# Patient Record
Sex: MALE | Race: WHITE
[De-identification: names, ages, dates, MRNs, and addresses within clinical notes are randomized per-mention and may not be internally consistent; named-entity substitution may affect disease eponyms.]

---

## 2017-02-15 ENCOUNTER — HOSPITAL ENCOUNTER (EMERGENCY)
Dept: HOSPITAL 80 - FED | Age: 77
Discharge: HOME | End: 2017-02-15
Payer: COMMERCIAL

## 2017-02-15 VITALS — DIASTOLIC BLOOD PRESSURE: 74 MMHG | SYSTOLIC BLOOD PRESSURE: 126 MMHG | OXYGEN SATURATION: 94 %

## 2017-02-15 VITALS — HEART RATE: 88 BPM

## 2017-02-15 VITALS — TEMPERATURE: 98.2 F | RESPIRATION RATE: 18 BRPM

## 2017-02-15 DIAGNOSIS — R06.02: Primary | ICD-10-CM

## 2017-02-15 DIAGNOSIS — Z79.82: ICD-10-CM

## 2017-02-15 LAB
% IMMATURE GRANULYOCYTES: 0.2 % (ref 0–1.1)
ABSOLUTE IMMATURE GRANULOCYTES: 0.02 10^3/UL (ref 0–0.1)
ABSOLUTE NRBC COUNT: 0 10^3/UL (ref 0–0.01)
ADD DIFF?: NO
ADD MORPH?: NO
ADD SCAN?: NO
ALBUMIN SERPL-MCNC: 4.4 G/DL (ref 3.5–5)
ALP SERPL-CCNC: 72 IU/L (ref 38–126)
ALT SERPL-CCNC: 34 IU/L (ref 21–72)
ANION GAP SERPL CALC-SCNC: 11 MEQ/L (ref 8–16)
APTT BLD: 27.8 SEC (ref 23–38)
AST SERPL-CCNC: 26 IU/L (ref 17–59)
ATYPICAL LYMPHOCYTE FLAG: 10 (ref 0–99)
BILIRUB SERPL-MCNC: 0.6 MG/DL (ref 0.1–1.4)
BILIRUBIN-CONJUGATED: 0.4 MG/DL (ref 0–0.5)
BILIRUBIN-UNCONJUGATED: 0.2 MG/DL (ref 0–1.1)
CALCIUM SERPL-MCNC: 10.3 MG/DL (ref 8.5–10.4)
CHLORIDE SERPL-SCNC: 102 MEQ/L (ref 97–110)
CO2 SERPL-SCNC: 26 MEQ/L (ref 22–31)
CREAT SERPL-MCNC: 0.9 MG/DL (ref 0.7–1.3)
ERYTHROCYTE [DISTWIDTH] IN BLOOD BY AUTOMATED COUNT: 13.2 % (ref 11.5–15.2)
FRAGMENT RBC FLAG: 0 (ref 0–99)
GFR SERPL CREATININE-BSD FRML MDRD: > 60 ML/MIN/{1.73_M2}
GLUCOSE SERPL-MCNC: 101 MG/DL (ref 70–100)
HCT VFR BLD CALC: 43 % (ref 40–51)
HGB BLD-MCNC: 15.1 G/DL (ref 13.7–17.5)
INR PPP: 1.04 (ref 0.83–1.16)
LEFT SHIFT FLG: 0 (ref 0–99)
LIPEMIA HEMOLYSIS FLAG: 90 (ref 0–99)
MCH RBC BLDCO QN: 31.1 PG (ref 27.9–34.1)
MCHC RBC AUTO-ENTMCNC: 35.1 G/DL (ref 32.4–36.7)
MCV RBC AUTO: 88.7 FL (ref 81.5–99.8)
NRBC-AUTO%: 0 % (ref 0–0.2)
PLATELET # BLD: 306 10^3/UL (ref 150–400)
PLATELET CLUMPS FLAG: 0 (ref 0–99)
PMV BLD AUTO: 9.3 FL (ref 8.7–11.7)
POTASSIUM SERPL-SCNC: 4.2 MEQ/L (ref 3.5–5.2)
PROT SERPL-MCNC: 7.5 G/DL (ref 6.3–8.2)
PROTHROMBIN TIME: 13.5 SEC (ref 12–15)
RBC # BLD AUTO: 4.85 10^6/UL (ref 4.4–6.38)
SODIUM SERPL-SCNC: 139 MEQ/L (ref 134–144)
TROPONIN I SERPL-MCNC: < 0.012 NG/ML (ref 0–0.03)

## 2017-02-15 NOTE — CPEKG
Heart Rate: 92

RR Interval: 652

P-R Interval: 152

QRSD Interval: 142

QT Interval: 396

QTC Interval: 490

P Axis: 61

QRS Axis: 56

T Wave Axis: 30

EKG Severity - ABNORMAL ECG -

EKG Impression: SINUS RHYTHM

EKG Impression: PROBABLE LEFT ATRIAL ABNORMALITY

EKG Impression: RIGHT BUNDLE BRANCH BLOCK

EKG Impression: BORDERLINE INFERIOR Q WAVES

Electronically Signed By: Mendy Lomeli 15-Feb-2017 20:42:30

## 2017-02-15 NOTE — EDPHY
H & P


Stated Complaint: sob on and off for 2 days


HPI/ROS: 





CHIEF COMPLAINT: Shortness of breath





HISTORY OF PRESENT ILLNESS:  intermittent dyspnea over the past week and a 

half.  This started he returned from a trip to [place].  The symptoms are 

described as difficulty taking a deep breath that then resolved.  Associated 

with shortness of breath at rest.  Worse with exertion.  No chest pain of any 

kind over the past 2 days but he did have a short course of very minimal pain 

previous to that.  No fever or chills.  No cough or congestion.  No lower 

extremity erythema, edema or pain.  However he is concerned because he has the 

same symptoms he has 6 years ago when he was diagnosed with pulmonary embolism.

  Does have a recent flight to [place] that he return from last Tuesday.  No 

other associated complaints or modifying factors.  Currently taking aspirin and 

no other anticoagulants





REVIEW OF SYSTEMS:


Ten systems reviewed and are negative unless otherwise noted in the HPI





EXAMINATION


General Appearance:  Alert, no distress


Head: normocephalic, atraumatic


Eyes:  Pupils equal and round, no conjunctival pallor or injection


ENT, Mouth:  Mucous membranes moist.  Uvula midline.  No erythema or edema.


Neck:  Normal inspection, supple, non-tender


Respiratory:  Lungs are clear to auscultation .  No wheezing, rhonchi or 

crackles.  No diminishment of distress.


Cardiovascular:    Tachycardic rate of 106 beats per minute.  Normal rhythm. 

Systolic murmur.  pulses intact distally.


Gastrointestinal:  Abdomen is soft and nontender


Back: non-tender, no bony abnormalities


Neurological:  A&O, nonfocal, normal gait


Skin:  Warm and dry, no rash


Extremities:  Nontender, no pedal edema .  No evidence of DVT


Psychiatric:  Mood and affect normal





DIFFERENTIAL DIAGNOSES:


Including but not limited to  PE, shortness of breath, pneumonia, ACS, 

bronchitis, pleural effusion





MDM:


2:30 p.m.


 dyspnea consistent with previous pulmonary embolism 6 years ago.  This has 

been present on and off for the past week and a half but worsened last night.  

He has no pain at this time and has had minimal pain at all over the past week 

and a half.  Vital signs are within normal limits with mild tachycardia.  I 

have ordered CT scan of the chest rule out PE.  EKG and labs are pending at 

this time.





4:14 p.m.


 patient is currently in CT scan at this time.  Laboratory studies reveal a 

normal creatinine on the point of care i-STAT.  His CBC is unremarkable.  

Remainder of labs are pending at this time.  EKG is unremarkable for any 

changes other than a new right bundle branch block.  No acute ischemia.





4:40 p.m.


 contacted by radiologist Dr. Grimm.  CT scan of the chest  has no evidence of 

PE.  There is some calcification of the ascending aorta and the coronary 

arteries but no acute findings.  I discussed this with the patient at this 

time.  He was pleased to hear this.  Vital signs remained stable. His 

laboratory studies are well within normal limits.  This includes a negative 

troponin and negative BNP.  He is comfortable with being discharged home at 

this time.  I do feel he is stable and safe to do so as he has had no chest 

pain of any kind.  He will follow up with his primary care physician to discuss 

with Pulmonary follow-up should they deem that necessary.  He is instructed to 

return to the emergency department should he have any onset of chest pain.








EKG:  Interpreted by Dr. Lomeli








SUPERVISION: Patient was evaluated in conjunction with the supervising 

physician.  Please see their note for details.


Source: Patient





- Personal History


Current Tetanus Diphtheria and Acellular Pertussis (TDAP): Yes





- Medical/Surgical History


Other PMH: PE, BACK CYST REMOVED, ARTHRITIS, HERNIA





- Social History


Smoking Status: Never smoked


Constitutional: 


 Initial Vital Signs











Temperature (C)  98.2 F   02/15/17 15:07


 


Heart Rate  103 H  02/15/17 15:07


 


Respiratory Rate  18   02/15/17 15:07


 


Blood Pressure  163/86 H  02/15/17 15:07


 


O2 Sat (%)  93   02/15/17 15:07








 











O2 Delivery Mode               Room Air














Allergies/Adverse Reactions: 


 





No Known Allergies Allergy (Unverified 06/08/10 13:12)


 








Home Medications: 














 Medication  Instructions  Recorded


 


Lotrel  06/08/10


 


ASPIRIN  02/15/17


 


Lipitor  02/15/17


 


Meloxicam  02/15/17














Medical Decision Making





- Data Points


Laboratory Results: 


 Laboratory Results





 02/15/17 15:45 





 02/15/17 15:45 





 











  02/15/17 02/15/17 02/15/17





  15:46 15:45 15:45


 


WBC      





    


 


RBC      





    


 


Hgb      





    


 


POC Hgb  15.3 gm/dL gm/dL    





   (14.5-17.3)   


 


Hct      





    


 


POC Hct  45 % %    





   (42.8-50.6)   


 


MCV      





    


 


MCH      





    


 


MCHC      





    


 


RDW      





    


 


Plt Count      





    


 


MPV      





    


 


Neut % (Auto)      





    


 


Lymph % (Auto)      





    


 


Mono % (Auto)      





    


 


Eos % (Auto)      





    


 


Baso % (Auto)      





    


 


Nucleat RBC Rel Count      





    


 


Absolute Neuts (auto)      





    


 


Absolute Lymphs (auto)      





    


 


Absolute Monos (auto)      





    


 


Absolute Eos (auto)      





    


 


Absolute Basos (auto)      





    


 


Absolute Nucleated RBC      





    


 


Immature Gran %      





    


 


Immature Gran #      





    


 


PT      13.5 SEC SEC





     (12.0-15.0) 


 


INR      1.04 





     (0.83-1.16) 


 


APTT      27.8 SEC SEC





     (23.0-38.0) 


 


POC Sodium  139 mEq/L mEq/L    





   (134-144)   


 


Sodium    139 mEq/L mEq/L  





    (134-144)  


 


POC Potassium  4.0 mEq/L mEq/L    





   (3.3-5.0)   


 


Potassium    4.2 mEq/L mEq/L  





    (3.5-5.2)  


 


POC Chloride  103 mEq/L mEq/L    





   ()   


 


Chloride    102 mEq/L mEq/L  





    ()  


 


Carbon Dioxide    26 mEq/l mEq/l  





    (22-31)  


 


Anion Gap    11 mEq/L mEq/L  





    (8-16)  


 


POC BUN  22 mg/dL mg/dL    





   (7-23)   


 


BUN    21 mg/dL mg/dL  





    (7-23)  


 


Creatinine    0.9 mg/dL mg/dL  





    (0.7-1.3)  


 


POC Creatinine  0.9 mg/dL mg/dL    





   (0.8-1.5)   


 


Estimated GFR    > 60   





    


 


Glucose    101 mg/dL H mg/dL  





    ()  


 


POC Glucose  104 mg/dL H mg/dL    





   ()   


 


Calcium    10.3 mg/dL mg/dL  





    (8.5-10.4)  


 


Total Bilirubin    0.6 mg/dL mg/dL  





    (0.1-1.4)  


 


Conjugated Bilirubin    0.4 mg/dL mg/dL  





    (0.0-0.5)  


 


Unconjugated Bilirubin    0.2 mg/dL mg/dL  





    (0.0-1.1)  


 


AST    26 IU/L IU/L  





    (17-59)  


 


ALT    34 IU/L IU/L  





    (21-72)  


 


Alkaline Phosphatase    72 IU/L IU/L  





    ()  


 


Troponin I    < 0.012 ng/mL ng/mL  





    (0-0.034)  


 


NT-Pro-B Natriuret Pep    95 pg/mL pg/mL  





    (0-125)  


 


Total Protein    7.5 g/dL g/dL  





    (6.3-8.2)  


 


Albumin    4.4 g/dL g/dL  





    (3.5-5.0)  














  02/15/17





  15:45


 


WBC  9.23 10^3/uL 10^3/uL





   (3.80-9.50) 


 


RBC  4.85 10^6/uL 10^6/uL





   (4.40-6.38) 


 


Hgb  15.1 g/dL g/dL





   (13.7-17.5) 


 


POC Hgb  





  


 


Hct  43.0 % %





   (40.0-51.0) 


 


POC Hct  





  


 


MCV  88.7 fL fL





   (81.5-99.8) 


 


MCH  31.1 pg pg





   (27.9-34.1) 


 


MCHC  35.1 g/dL g/dL





   (32.4-36.7) 


 


RDW  13.2 % %





   (11.5-15.2) 


 


Plt Count  306 10^3/uL 10^3/uL





   (150-400) 


 


MPV  9.3 fL fL





   (8.7-11.7) 


 


Neut % (Auto)  64.6 % %





   (39.3-74.2) 


 


Lymph % (Auto)  23.3 % %





   (15.0-45.0) 


 


Mono % (Auto)  6.2 % %





   (4.5-13.0) 


 


Eos % (Auto)  4.7 % %





   (0.6-7.6) 


 


Baso % (Auto)  1.0 % %





   (0.3-1.7) 


 


Nucleat RBC Rel Count  0.0 % %





   (0.0-0.2) 


 


Absolute Neuts (auto)  5.97 10^3/uL 10^3/uL





   (1.70-6.50) 


 


Absolute Lymphs (auto)  2.15 10^3/uL 10^3/uL





   (1.00-3.00) 


 


Absolute Monos (auto)  0.57 10^3/uL 10^3/uL





   (0.30-0.80) 


 


Absolute Eos (auto)  0.43 10^3/uL H 10^3/uL





   (0.03-0.40) 


 


Absolute Basos (auto)  0.09 10^3/uL 10^3/uL





   (0.02-0.10) 


 


Absolute Nucleated RBC  0.00 10^3/uL 10^3/uL





   (0-0.01) 


 


Immature Gran %  0.2 % %





   (0.0-1.1) 


 


Immature Gran #  0.02 10^3/uL 10^3/uL





   (0.00-0.10) 


 


PT  





  


 


INR  





  


 


APTT  





  


 


POC Sodium  





  


 


Sodium  





  


 


POC Potassium  





  


 


Potassium  





  


 


POC Chloride  





  


 


Chloride  





  


 


Carbon Dioxide  





  


 


Anion Gap  





  


 


POC BUN  





  


 


BUN  





  


 


Creatinine  





  


 


POC Creatinine  





  


 


Estimated GFR  





  


 


Glucose  





  


 


POC Glucose  





  


 


Calcium  





  


 


Total Bilirubin  





  


 


Conjugated Bilirubin  





  


 


Unconjugated Bilirubin  





  


 


AST  





  


 


ALT  





  


 


Alkaline Phosphatase  





  


 


Troponin I  





  


 


NT-Pro-B Natriuret Pep  





  


 


Total Protein  





  


 


Albumin  





  











Medications Given: 


 








Discontinued Medications





Sodium Chloride (Ns)  500 mls @ 0 mls/hr IV ONCE ONE


   PRN Reason: As Directed


   Stop: 02/15/17 15:29


   Last Admin: 02/15/17 15:50 Dose:  500 mls





Point of Care Test Results: 


 











  02/15/17





  15:46


 


POC Sodium  139


 


POC Potassium  4.0


 


POC Chloride  103


 


POC BUN  22


 


POC Creatinine  0.9


 


POC Glucose  104 H














Departure





- Departure


Disposition: Home, Routine, Self-Care


Clinical Impression: 


Dyspnea


Qualifiers:


 Dyspnea type: shortness of breath Qualified Code(s): R06.02 - Shortness of 

breath





Condition: Good


Instructions:  Dyspnea (ED)


Additional Instructions: 


  Contact primary care physician for further care and possible pulmonology 

follow-up.  Return to the ER for any chest pain of any kind.  Return to the ER 

for worsening shortness of breath or for any fever.


Referrals: 


Kusum Ignacio MD [Primary Care Provider] - As per Instructions

## 2017-11-20 ENCOUNTER — HOSPITAL ENCOUNTER (OUTPATIENT)
Dept: HOSPITAL 80 - BMCIMAGING | Age: 77
End: 2017-11-20
Attending: INTERNAL MEDICINE
Payer: COMMERCIAL

## 2017-11-20 DIAGNOSIS — I65.23: Primary | ICD-10-CM

## 2017-11-20 DIAGNOSIS — E04.1: ICD-10-CM

## 2018-11-29 ENCOUNTER — HOSPITAL ENCOUNTER (OUTPATIENT)
Dept: HOSPITAL 80 - FIMAGING | Age: 78
End: 2018-11-29
Attending: INTERNAL MEDICINE
Payer: COMMERCIAL

## 2018-11-29 DIAGNOSIS — E04.1: Primary | ICD-10-CM

## 2018-11-29 DIAGNOSIS — I65.23: ICD-10-CM

## 2018-11-29 DIAGNOSIS — I70.0: ICD-10-CM

## 2018-12-06 ENCOUNTER — HOSPITAL ENCOUNTER (OUTPATIENT)
Dept: HOSPITAL 80 - FIMAGING | Age: 78
End: 2018-12-06
Attending: INTERNAL MEDICINE
Payer: COMMERCIAL

## 2018-12-06 DIAGNOSIS — E04.1: Primary | ICD-10-CM

## 2018-12-06 PROCEDURE — 0G9K3ZX DRAINAGE OF THYROID GLAND, PERCUTANEOUS APPROACH, DIAGNOSTIC: ICD-10-PCS | Performed by: RADIOLOGY
